# Patient Record
Sex: MALE | Race: WHITE | NOT HISPANIC OR LATINO | Employment: OTHER | URBAN - METROPOLITAN AREA
[De-identification: names, ages, dates, MRNs, and addresses within clinical notes are randomized per-mention and may not be internally consistent; named-entity substitution may affect disease eponyms.]

---

## 2020-11-02 ENCOUNTER — TELEPHONE (OUTPATIENT)
Dept: ONCOLOGY | Facility: CLINIC | Age: 80
End: 2020-11-02

## 2020-11-02 NOTE — TELEPHONE ENCOUNTER
Called pt and LVM stating that he would have to get biopsy results from that date from the [facility] at [facility] ([phone]). Also called pt's wife and gave her the number. She v/u.

## 2020-11-02 NOTE — TELEPHONE ENCOUNTER
Patient would like to speak to Dr. Peter's assistant.  He needs a biopsy report from August 2004.  He says it is fairly urgent that he get this.    [phone]

## 2021-03-09 DIAGNOSIS — Z23 IMMUNIZATION DUE: ICD-10-CM
